# Patient Record
Sex: FEMALE | Race: WHITE | NOT HISPANIC OR LATINO
[De-identification: names, ages, dates, MRNs, and addresses within clinical notes are randomized per-mention and may not be internally consistent; named-entity substitution may affect disease eponyms.]

---

## 2024-03-18 PROBLEM — Z00.00 ENCOUNTER FOR PREVENTIVE HEALTH EXAMINATION: Status: ACTIVE | Noted: 2024-03-18

## 2024-03-20 ENCOUNTER — NON-APPOINTMENT (OUTPATIENT)
Age: 54
End: 2024-03-20

## 2024-03-21 ENCOUNTER — APPOINTMENT (OUTPATIENT)
Dept: BREAST CENTER | Facility: CLINIC | Age: 54
End: 2024-03-21
Payer: COMMERCIAL

## 2024-03-21 ENCOUNTER — TRANSCRIPTION ENCOUNTER (OUTPATIENT)
Age: 54
End: 2024-03-21

## 2024-03-21 ENCOUNTER — RESULT REVIEW (OUTPATIENT)
Age: 54
End: 2024-03-21

## 2024-03-21 ENCOUNTER — NON-APPOINTMENT (OUTPATIENT)
Age: 54
End: 2024-03-21

## 2024-03-21 DIAGNOSIS — R59.9 ENLARGED LYMPH NODES, UNSPECIFIED: ICD-10-CM

## 2024-03-21 DIAGNOSIS — R03.0 ELEVATED BLOOD-PRESSURE READING, W/OUT DIAGNOSIS OF HYPERTENSION: ICD-10-CM

## 2024-03-21 DIAGNOSIS — M19.049 PRIMARY OSTEOARTHRITIS, UNSPECIFIED HAND: ICD-10-CM

## 2024-03-21 PROCEDURE — 93702 BIS XTRACELL FLUID ANALYSIS: CPT | Mod: NC

## 2024-03-21 PROCEDURE — 99205 OFFICE O/P NEW HI 60 MIN: CPT | Mod: 25

## 2024-03-22 PROBLEM — M19.049 ARTHRITIS OF HAND: Status: RESOLVED | Noted: 2024-03-22 | Resolved: 2024-03-22

## 2024-03-22 PROBLEM — R03.0 BORDERLINE HYPERTENSION: Status: RESOLVED | Noted: 2024-03-22 | Resolved: 2024-03-22

## 2024-03-24 ENCOUNTER — NON-APPOINTMENT (OUTPATIENT)
Age: 54
End: 2024-03-24

## 2024-03-27 ENCOUNTER — NON-APPOINTMENT (OUTPATIENT)
Age: 54
End: 2024-03-27

## 2024-03-28 NOTE — CONSULT LETTER
[( Thank you for referring [unfilled] for consultation for _____ )] : Thank you for referring [unfilled] for consultation for [unfilled] [Dear  ___] : Dear  [unfilled], [Please see my note below.] : Please see my note below. [Consult Closing:] : Thank you very much for allowing me to participate in the care of this patient.  If you have any questions, please do not hesitate to contact me. [Sincerely,] : Sincerely, [DrNery  ___] : Dr. DAVIS [FreeTextEntry3] : Crystal [place] [address]

## 2024-03-28 NOTE — ASSESSMENT
[FreeTextEntry1] : The pathology and imaging results were reviewed and discussed. She is a stage IA left breast cancer (T1cN0) HER2 negative, ER+/HI+ (AJCC 8thed).  Breast MRI is recommended to rule out multicentric or multifocal disease.  The use of multimodality therapy for the treatment of breast cancer was discussed.   Surgical options were reviewed including a lumpectomy to negative margins, with whole breast radiation therapy versus a mastectomy with or without reconstruction. Included in this discussion with the results of the NSABP-04 and 06 trials.   Mastectomy techniques were discussed in detail including skin sparing and nipple sparing techniques. Recurrence was reviewed and that mastectomy does not confer a 100% risk reduction nor guarantee against breast cancer in the future.  Reconstructive options were briefly reviewed, including implant based versus tissue flap based reconstruction options.  Referral  to a plastic surgeon was offered.  The patient was informed that breast reconstruction is covered by insurance per state and federal laws.     Axillary staging was discussed. We reviewed the sentinel lymph node procedure, and how if the sentinel lymph node is found to have metastatic disease either on the intraoperative evaluation or on the final pathology, that an axillary dissection of the remaining lymph nodes may be recommended. It was explained that an axillary dissection takes "level one and level two" lymph nodes, and also "level three" if they feel clinically suspicious. It was explained that if the sentinel lymph node was not able to be found, that an axillary dissection may be necessary.  I reviewed the risks of lymphedema for SLND (6%) versus ALND (20%). I reviewed our lymphedema monitoring program. Baseline SOZO was completed.  The Z-0011 study was touched upon, outlining that there is evidence that it may not be necessary to complete an axillary dissection in select patients even if one or two sentinel nodes are positive for cancer, as long as the cancer in the nodes is confined to within the nodes, the nodes aren't  matted down, and if the cancer meets certain criteria including being less than 5 cm, treated by lumpectomy and conventional whole breast radiation, and the patient is planning to take recommended adjuvant therapy, and didn't require preoperative chemotherapy.     The general indications for the use of adjuvant hormonal and chemotherapy and targeted therapies were discussed. It was explained that medical treatments are dependent on pathology results including receptor studies.  It was explained that some patients have further genetic testing of their tumors before a decision about chemotherapy can be made.  The indications for radiation therapy and side effects were also discussed including the use varying lengths of whole breast radiation.  It was explained that radiation is generally reserved for lumpectomy patients, and patients with larger tumors or positive lymph nodes. Common side effects were reviewed.  The genetics of breast cancer were discussed with the implications for risk of contralateral cancer and other associated cancers, implication for ovarian risk, options for management, and implications for family members. She had genetic testing done today.  She was given a binder. She is motivated towards breast conserving surgery with symmetrizing lift. She will see plastic surgery after MRI. I will get 2nd opinion pathology at [facility] and also call her with genetic results asap. I believe I was able to answer all of her questions to her satisfaction.

## 2024-03-28 NOTE — PHYSICAL EXAM
[Normocephalic] : normocephalic [Atraumatic] : atraumatic [Supple] : supple [No Supraclavicular Adenopathy] : no supraclavicular adenopathy [Examined in the supine and seated position] : examined in the supine and seated position [Grade 2] : Ptosis Grade 2 [No dominant masses] : no dominant masses in right breast  [No Nipple Retraction] : no left nipple retraction [No Nipple Discharge] : no left nipple discharge [No Axillary Lymphadenopathy] : no left axillary lymphadenopathy [No Rashes] : no rashes [No Edema] : no edema [No Ulceration] : no ulceration [de-identified] : L>R, there is bruising in the UOQ and palpable 1cm mass c/w known carcinoma

## 2024-03-28 NOTE — HISTORY OF PRESENT ILLNESS
[FreeTextEntry1] : Natalie is a 54-year-old female referred for newly diagnosed invasive ductal carcinoma of the left breast.   Her screening mammogram (08/29/2023, [facility]) showed scattered fibroglandular densities and a small irregular 0.7 x 0.5 cm mass in the left superolateral breast.  Diagnostic mammogram and left targeted ultrasound were recommended and done on 03/05/2024 ([facility]). Mammo findings confirmed a persistent superolateral breast asymmetry with correlating ultrasound findings of a 0.8 x 1 0.5 cm ill-defined mass in the left breast 2:00 N6. Ultrasound biopsy was advised and done on 3/58/2024 ([facility]). Pathology of left 2:00 N6 (heart-shaped clip) showed moderately differentiated IDC, grade 6/9, ER strongly to moderately positive in >90%, SC strongly positive in >90%, Her2 negative and Ki67 9%.  She does SBE. She has not noticed a change in her breast or a breast lump. She has not noticed a change in her nipple or nipple area. She has not noticed a change in the skin of the breast. She is not experiencing nipple discharge. She is not experiencing breast pain. She has not noticed a lump or lymph node under the armpit.   BREAST CANCER RISK FACTORS Menarche:14 Date of LMP:3/12/2024 Menopause:NA Grav:   5   Para: 3 Age at first live birth: 33 Nursed: Y Hysterectomy: N Oophorectomy: N OCP: Y for 5 yeras HRT: N Last pap/pelvic exam: 8/2023 Related family history:mgm bca @90s; Maunt bca @ 60's; M aunt colon ca @70s Ashkenazi: N Mastery risk assessment: NA Genetic testing: sent today Bra size:34 C   Last mammogram:  03/05/2024 Left Diag (bilat 08/29/2023)    Location: [facility] Report reviewed.                                 Images reviewed. Results: Birads 4 Scattered fibroglandular densities. Left superolateral persistent superolateral breast asymmetry   Last ultrasound:   03/05/2024 Left targeted.            Location: [facility] Report reviewed.                                 Images reviewed. Results: Birads 4 Left breast 2:00 N60.8 x 1 0.5 cm ill-defined mass. Rec: Left ultrasound biopsy    Last MRI:  never                                           Location: Report reviewed.

## 2024-03-29 DIAGNOSIS — R92.8 OTHER ABNORMAL AND INCONCLUSIVE FINDINGS ON DIAGNOSTIC IMAGING OF BREAST: ICD-10-CM

## 2024-04-11 ENCOUNTER — RESULT REVIEW (OUTPATIENT)
Age: 54
End: 2024-04-11

## 2024-04-11 ENCOUNTER — APPOINTMENT (OUTPATIENT)
Dept: BREAST CENTER | Facility: HOSPITAL | Age: 54
End: 2024-04-11

## 2024-04-11 ENCOUNTER — TRANSCRIPTION ENCOUNTER (OUTPATIENT)
Age: 54
End: 2024-04-11

## 2024-04-16 DIAGNOSIS — N63.0 UNSPECIFIED LUMP IN UNSPECIFIED BREAST: ICD-10-CM

## 2024-04-17 ENCOUNTER — NON-APPOINTMENT (OUTPATIENT)
Age: 54
End: 2024-04-17

## 2024-04-17 ENCOUNTER — APPOINTMENT (OUTPATIENT)
Dept: BREAST CENTER | Facility: CLINIC | Age: 54
End: 2024-04-17
Payer: COMMERCIAL

## 2024-04-17 DIAGNOSIS — F51.02 ADJUSTMENT INSOMNIA: ICD-10-CM

## 2024-04-17 PROCEDURE — 99024 POSTOP FOLLOW-UP VISIT: CPT

## 2024-04-17 RX ORDER — DIAZEPAM 5 MG/1
5 TABLET ORAL
Qty: 10 | Refills: 0 | Status: ACTIVE | COMMUNITY
Start: 2024-04-17 | End: 1900-01-01

## 2024-04-17 NOTE — CONSULT LETTER
[Dear  ___] : Dear  [unfilled], [( Thank you for referring [unfilled] for consultation for _____ )] : Thank you for referring [unfilled] for consultation for [unfilled] [Please see my note below.] : Please see my note below. [Consult Closing:] : Thank you very much for allowing me to participate in the care of this patient.  If you have any questions, please do not hesitate to contact me. [Sincerely,] : Sincerely, [DrNery  ___] : Dr. DAVIS [Courtesy Letter:] : I had the pleasure of seeing your patient, [unfilled], in my office today. [FreeTextEntry1] : She has requested to see medical oncology at [facility] and radiation oncology at . [FreeTextEntry3] : Crystal [place] [address]

## 2024-04-17 NOTE — PHYSICAL EXAM
[Normocephalic] : normocephalic [Atraumatic] : atraumatic [Supple] : supple [No Supraclavicular Adenopathy] : no supraclavicular adenopathy [Examined in the supine and seated position] : examined in the supine and seated position [Grade 2] : Ptosis Grade 2 [No dominant masses] : no dominant masses in right breast  [No Nipple Retraction] : no left nipple retraction [No Nipple Discharge] : no left nipple discharge [No Axillary Lymphadenopathy] : no left axillary lymphadenopathy [No Edema] : no edema [No Rashes] : no rashes [No Ulceration] : no ulceration [de-identified] : L>R, there is bruising in the UOQ and palpable 1cm mass c/w known carcinoma [Symmetrical] : symmetrical [No dominant masses] : no dominant masses left breast [de-identified] : healing well, bruising as expected, no collections

## 2024-04-17 NOTE — ASSESSMENT
[FreeTextEntry1] : The pathology and imaging results were reviewed and discussed. She is a stage IA left breast cancer (T1cN0) HER2 negative, ER+/DE+ (AJCC 8thed). doing well path reviewed copy given rec med/onc cs and rad/onc cs--requesting RTx at [facility]  will send oncotype  f/u 1 month She knows to call or return sooner should any concerns or questions arise.

## 2024-04-17 NOTE — HISTORY OF PRESENT ILLNESS
[FreeTextEntry1] : Natalie is a 54-year-old female referred for newly diagnosed invasive ductal carcinoma of the left breast.   Her screening mammogram (08/29/2023, [facility]) showed scattered fibroglandular densities and a small irregular 0.7 x 0.5 cm mass in the left superolateral breast.  Diagnostic mammogram and left targeted ultrasound were recommended and done on 03/05/2024 ([facility]). Mammo findings confirmed a persistent superolateral breast asymmetry with correlating ultrasound findings of a 0.8 x 1 0.5 cm ill-defined mass in the left breast 2:00 N6. Ultrasound biopsy was advised and done on 3/58/2024 ([facility]). Pathology of left 2:00 N6 (heart-shaped clip) showed moderately differentiated IDC, grade 6/9, ER strongly to moderately positive in >90%, NJ strongly positive in >90%, Her2 negative and Ki67 9%.  4/11/2024 patient underwent a left partial mastectomy/slnb- final pathology showed:  IDC, 10mm, grade 4/9, ER strongly positive in %, Pr strongly positive in %, Her 2 negative and Ki67 5%.  All margins negative for carcinoma. SLNE showed 0/9 nodes negative for malignancy. Closest margin 8 mm anterior. All others > 10mm. She is doing well post op. She is c/o insomnia.  She does SBE. She has not noticed a change in her breast or a breast lump. She has not noticed a change in her nipple or nipple area. She has not noticed a change in the skin of the breast. She is not experiencing nipple discharge. She is not experiencing breast pain. She has not noticed a lump or lymph node under the armpit.   BREAST CANCER RISK FACTORS Menarche:14 Date of LMP:3/12/2024 Menopause:NA Grav:   5   Para: 3 Age at first live birth: 33 Nursed: Y Hysterectomy: N Oophorectomy: N OCP: Y for 5 yeras HRT: N Last pap/pelvic exam: 8/2023 Related family history:mgm bca @90s; Maunt bca @ 60's; M aunt colon ca @70s Ashkenazi: N Mastery risk assessment: NA Genetic testing: negative myriad 3/2024 Bra size:34 C   Last mammogram:  03/05/2024 Left Diag (bilat 08/29/2023)    Location: [facility] Report reviewed.                                 Images reviewed. Results: Birads 4 Scattered fibroglandular densities. Left superolateral persistent superolateral breast asymmetry   Last ultrasound:   03/05/2024 Left targeted.            Location: [facility] Report reviewed.                                 Images reviewed. Results: Birads 4 Left breast 2:00 N60.8 x 1 0.5 cm ill-defined mass. Rec: Left ultrasound biopsy    Last MRI:  never                                           Location: Report reviewed.

## 2024-04-19 ENCOUNTER — APPOINTMENT (OUTPATIENT)
Dept: BREAST CENTER | Facility: CLINIC | Age: 54
End: 2024-04-19
Payer: COMMERCIAL

## 2024-04-19 VITALS
BODY MASS INDEX: 22.15 KG/M2 | HEIGHT: 63 IN | HEART RATE: 75 BPM | DIASTOLIC BLOOD PRESSURE: 77 MMHG | SYSTOLIC BLOOD PRESSURE: 122 MMHG | WEIGHT: 125 LBS | OXYGEN SATURATION: 99 %

## 2024-04-19 PROCEDURE — 10021 FNA BX W/O IMG GDN 1ST LES: CPT

## 2024-04-19 PROCEDURE — 99024 POSTOP FOLLOW-UP VISIT: CPT

## 2024-04-19 RX ORDER — ESCITALOPRAM OXALATE 10 MG/1
10 TABLET, FILM COATED ORAL
Refills: 0 | Status: ACTIVE | COMMUNITY

## 2024-04-19 NOTE — ASSESSMENT
[FreeTextEntry1] : 55 yo female with left axillary hematoma aspiration completed without incident plan us follow up next week She knows to call or return sooner should any concerns or questions arise.

## 2024-04-19 NOTE — HISTORY OF PRESENT ILLNESS
[FreeTextEntry1] : Natalie is a 54-year-old female referred for newly diagnosed invasive ductal carcinoma of the left breast.   Her screening mammogram (08/29/2023, [facility]) showed scattered fibroglandular densities and a small irregular 0.7 x 0.5 cm mass in the left superolateral breast.  Diagnostic mammogram and left targeted ultrasound were recommended and done on 03/05/2024 ([facility]). Mammo findings confirmed a persistent superolateral breast asymmetry with correlating ultrasound findings of a 0.8 x 1 0.5 cm ill-defined mass in the left breast 2:00 N6. Ultrasound biopsy was advised and done on 3/58/2024 ([facility]). Pathology of left 2:00 N6 (heart-shaped clip) showed moderately differentiated IDC, grade 6/9, ER strongly to moderately positive in >90%, AZ strongly positive in >90%, Her2 negative and Ki67 9%.  4/11/2024 patient underwent a left partial mastectomy/slnb- final pathology showed:  IDC, 10mm, grade 4/9, ER strongly positive in %, Pr strongly positive in %, Her 2 negative and Ki67 5%.  All margins negative for carcinoma. SLNE showed 0/9 nodes negative for malignancy. Closest margin 8 mm anterior. All others > 10mm. She is c/o today of swelling under the arm for past 48hours. She does SBE. She has not noticed a change in her breast or a breast lump. She has not noticed a change in her nipple or nipple area. She has not noticed a change in the skin of the breast. She is not experiencing nipple discharge. She is experiencing breast pain left axilla She has noticed a lump or lymph node under the left armpit   BREAST CANCER RISK FACTORS Menarche:14 Date of LMP:3/12/2024 Menopause:NA Grav:   5   Para: 3 Age at first live birth: 33 Nursed: Y Hysterectomy: N Oophorectomy: N OCP: Y for 5 yeras HRT: N Last pap/pelvic exam: 8/2023 Related family history:mgm bca @90s; Maunt bca @ 60's; M aunt colon ca @70s Ashkenazi: N Mastery risk assessment: NA Genetic testing: negative myriad 3/2024 Bra size:34 C   Last mammogram:  03/05/2024 Left Diag (bilat 08/29/2023)    Location: [facility] Report reviewed.                                 Images reviewed. Results: Birads 4 Scattered fibroglandular densities. Left superolateral persistent superolateral breast asymmetry   Last ultrasound:   03/05/2024 Left targeted.            Location: [facility] Report reviewed.                                 Images reviewed. Results: Birads 4 Left breast 2:00 N60.8 x 1 0.5 cm ill-defined mass. Rec: Left ultrasound biopsy    Last MRI:  never                                           Location: Report reviewed.

## 2024-04-26 ENCOUNTER — NON-APPOINTMENT (OUTPATIENT)
Age: 54
End: 2024-04-26

## 2024-05-01 ENCOUNTER — APPOINTMENT (OUTPATIENT)
Dept: BREAST CENTER | Facility: CLINIC | Age: 54
End: 2024-05-01
Payer: COMMERCIAL

## 2024-05-01 VITALS
OXYGEN SATURATION: 100 % | WEIGHT: 125 LBS | SYSTOLIC BLOOD PRESSURE: 122 MMHG | HEIGHT: 63 IN | BODY MASS INDEX: 22.15 KG/M2 | DIASTOLIC BLOOD PRESSURE: 80 MMHG | HEART RATE: 65 BPM

## 2024-05-01 DIAGNOSIS — L76.34 POSTPROCEDURAL SEROMA OF SKIN AND SUBCUTANEOUS TISSUE FOLLOWING OTHER PROCEDURE: ICD-10-CM

## 2024-05-01 PROCEDURE — 99024 POSTOP FOLLOW-UP VISIT: CPT

## 2024-05-07 ENCOUNTER — RESULT REVIEW (OUTPATIENT)
Age: 54
End: 2024-05-07

## 2024-05-07 ENCOUNTER — NON-APPOINTMENT (OUTPATIENT)
Age: 54
End: 2024-05-07

## 2024-05-07 ENCOUNTER — APPOINTMENT (OUTPATIENT)
Dept: HEMATOLOGY ONCOLOGY | Facility: CLINIC | Age: 54
End: 2024-05-07
Payer: COMMERCIAL

## 2024-05-07 VITALS
SYSTOLIC BLOOD PRESSURE: 138 MMHG | DIASTOLIC BLOOD PRESSURE: 82 MMHG | TEMPERATURE: 98.3 F | RESPIRATION RATE: 18 BRPM | HEART RATE: 70 BPM | WEIGHT: 126 LBS | HEIGHT: 63 IN | OXYGEN SATURATION: 99 % | BODY MASS INDEX: 22.32 KG/M2

## 2024-05-07 DIAGNOSIS — Z78.9 OTHER SPECIFIED HEALTH STATUS: ICD-10-CM

## 2024-05-07 DIAGNOSIS — Z80.3 FAMILY HISTORY OF MALIGNANT NEOPLASM OF BREAST: ICD-10-CM

## 2024-05-07 PROCEDURE — 99205 OFFICE O/P NEW HI 60 MIN: CPT

## 2024-05-07 PROCEDURE — 36415 COLL VENOUS BLD VENIPUNCTURE: CPT

## 2024-05-07 RX ORDER — TAMOXIFEN CITRATE 20 MG/1
20 TABLET, FILM COATED ORAL DAILY
Qty: 90 | Refills: 3 | Status: ACTIVE | COMMUNITY
Start: 2024-05-07 | End: 1900-01-01

## 2024-05-07 NOTE — PHYSICAL EXAM
[Fully active, able to carry on all pre-disease performance without restriction] : Status 0 - Fully active, able to carry on all pre-disease performance without restriction [Normal] : affect appropriate [de-identified] : s/p bilateral lumpectomy. healing well, no signs of infection,. no palpable abnormalities or lymphadenopathy bilaterally

## 2024-05-07 NOTE — CONSULT LETTER
[Dear  ___] : Dear  [unfilled], [Consult Letter:] : I had the pleasure of evaluating your patient, [unfilled]. [Please see my note below.] : Please see my note below. [Consult Closing:] : Thank you very much for allowing me to participate in the care of this patient.  If you have any questions, please do not hesitate to contact me. [Sincerely,] : Sincerely, [FreeTextEntry3] : Regla Faustin DO, FACABEBA, FACP Medical Oncology and Hematology [facility]

## 2024-05-07 NOTE — ASSESSMENT
[FreeTextEntry1] : #L sided IDC, 10mm, grade 4/9, ER strongly positive in %, Pr strongly positive in %, Her 2 negative and Ki67 5%. All margins negative for carcinoma. SLNE showed 0/9 nodes negative for malignancy. Reviewed the diagnosis, prognosis and natural history of IDC, ER/MD+ HER2- Reviewed the imaging and path Reviewed the NCCN guidelines and patient expresses understanding Oncotype 7 - no benefit for chemo She is premenopausal thus would recommend Tamoxifen. We reviewed the side effects of tamoxifen to include but are not limited to increased risk of uterine cancer, increased risk of VTE, hot flashes, decreased labido, vaginal dryness, mood swings prior to starting tamoxifen she will be evaluated by Radiation oncology to discuss the role of RT.  She lives in [place] thus would like to do RT in [place]. She was given the name of an Radonc by Dr. Tena We did discuss the  Trial here but she is not interested She will continue to follow with Dr. Tena  RTC in 3 months with cbc with diff, cmp, vit D and to see how she is tolerating tamoxifen

## 2024-05-07 NOTE — HISTORY OF PRESENT ILLNESS
[de-identified] : Ms. Xiong is a 54-year-old female referred for newly diagnosed invasive ductal carcinoma of the left breast.   Oncological History:   s/p screening josé 8/29/2023 showed scattered fibroglandular densities and a small irregular 0.7 x 0.5 cm mass in the left superolateral breast.   s/p diagnostic mammogram and left targeted ultrasound were recommended and done on 03/05/2024. Mammo findings confirmed a persistent superolateral breast asymmetry with correlating ultrasound findings of a 0.8 x 1 0.5 cm ill-defined mass in the left breast 2:00 N6.   s/p US guided bx 3/58/2024 pathology of left 2:00 N6 (heart-shaped clip) showed moderately differentiated IDC, grade 6/9, ER strongly to moderately positive in >90%, NM strongly positive in >90%, Her2 negative and Ki67 9%.  4/11/2024 patient underwent a left partial mastectomy/slnb- final pathology showed: IDC, 10mm, grade 4/9, ER strongly positive in %, Pr strongly positive in %, Her 2 negative and Ki67 5%. All margins negative for carcinoma. SLNE showed 0/9 nodes negative for malignancy. Closest margin 8 mm anterior. All others > 10mm. S  Oncotype 7  BREAST CANCER RISK FACTORS Menarche:14 Date of LMP:4/2024  Menopause: NA Grav: 5 Para: 3 Age at first live birth: 33 Nursed: Y Hysterectomy: N Oophorectomy: N OCP: Y for 5 yeras HRT: N Last pap/pelvic exam: 8/2023 Related family history:mgm bca @90s; Maunt bca @ 60's; M aunt colon ca @70s Ashkenazi: N Mastery risk assessment: NA Genetic testing: negative myriad 3/2024

## 2024-05-10 NOTE — ASSESSMENT
[FreeTextEntry1] : 53 yo female with left axillary hematoma doing well plan observation plan f/u on 5/17 She knows to call or return sooner should any concerns or questions arise.

## 2024-05-10 NOTE — HISTORY OF PRESENT ILLNESS
[FreeTextEntry1] : Natalie is a 54-year-old female referred for newly diagnosed invasive ductal carcinoma of the left breast.   Her screening mammogram (08/29/2023, [facility]) showed scattered fibroglandular densities and a small irregular 0.7 x 0.5 cm mass in the left superolateral breast.  Diagnostic mammogram and left targeted ultrasound were recommended and done on 03/05/2024 ([facility]). Mammo findings confirmed a persistent superolateral breast asymmetry with correlating ultrasound findings of a 0.8 x 1 0.5 cm ill-defined mass in the left breast 2:00 N6. Ultrasound biopsy was advised and done on 3/58/2024 ([facility]). Pathology of left 2:00 N6 (heart-shaped clip) showed moderately differentiated IDC, grade 6/9, ER strongly to moderately positive in >90%, NH strongly positive in >90%, Her2 negative and Ki67 9%.  4/3/24 patient underwent bilateral MRI biopsies x4. Final pathology (1) Right 10:00 n3, anterior: portions sclerosing intraductal papilloma with calcs and associated duct ectasia, (2) Right 3:00 n3, posterior: radial sclerosing lesion, (3) Left 2:00 n6, outer: fibroadenomatous change, columnar cell change with microcyst formation and PASH, and (4) Left 4:00-6:00, inner: mild duct ectasia, adenosis, and stromal fibrosis.  4/11/2024 patient underwent a left partial mastectomy/slnb- final pathology showed: IDC, 10mm, grade 4/9, ER strongly positive in %, Pr strongly positive in %, Her 2 negative and Ki67 5%. All margins negative for carcinoma. SLNE showed 0/9 nodes negative for malignancy. Closest margin 8 mm anterior. All others > 10mm. Oncotype score 7. Right breast partial mastectomy final pathology showed benign radial sclerosing lesion with cystic metaplasia.  She does SBE. She has not noticed a change in her breast or a breast lump. She has not noticed a change in her nipple or nipple area. She has not noticed a change in the skin of the breast. She is not experiencing nipple discharge. She is experiencing breast pain left axilla She has noticed a lump or lymph node under the left armpit   BREAST CANCER RISK FACTORS Menarche:14 Date of LMP:3/12/2024 Menopause:NA Grav:   5   Para: 3 Age at first live birth: 33 Nursed: Y Hysterectomy: N Oophorectomy: N OCP: Y for 5 yeras HRT: N Last pap/pelvic exam: 8/2023 Related family history:mgm bca @90s; Maglenys bca @ 60's; M aunt colon ca @70s Ashkenazi: N Mastery risk assessment: NA Genetic testing: negative myriad 3/2024 Bra size:34 C   Last mammogram:  03/05/2024 Left Diag (bilat 08/29/2023)    Location: [facility] Report reviewed.                                 Images reviewed. Results: Birads 4 Scattered fibroglandular densities. Left superolateral persistent superolateral breast asymmetry   Last ultrasound:   03/05/2024 Left targeted.            Location: [facility] Report reviewed.                                 Images reviewed. Results: Birads 4 Left breast 2:00 N60.8 x 1 0.5 cm ill-defined mass. Rec: Left ultrasound biopsy    Last MRI:  never                                           Location: Report reviewed.

## 2024-05-10 NOTE — PHYSICAL EXAM
[de-identified] : healing PMX, bruising as expected, improving overall [de-identified] : there is now bruising and swelling along left axilla

## 2024-05-17 ENCOUNTER — APPOINTMENT (OUTPATIENT)
Dept: BREAST CENTER | Facility: CLINIC | Age: 54
End: 2024-05-17
Payer: COMMERCIAL

## 2024-05-17 DIAGNOSIS — N64.89 OTHER SPECIFIED DISORDERS OF BREAST: ICD-10-CM

## 2024-05-17 DIAGNOSIS — C50.412 MALIGNANT NEOPLASM OF UPPER-OUTER QUADRANT OF LEFT FEMALE BREAST: ICD-10-CM

## 2024-05-17 DIAGNOSIS — Z17.0 MALIGNANT NEOPLASM OF UPPER-OUTER QUADRANT OF LEFT FEMALE BREAST: ICD-10-CM

## 2024-05-17 PROCEDURE — 99024 POSTOP FOLLOW-UP VISIT: CPT

## 2024-05-17 PROCEDURE — 93702 BIS XTRACELL FLUID ANALYSIS: CPT | Mod: NC

## 2024-05-17 NOTE — PHYSICAL EXAM
[de-identified] : healing PMX, bruising as expected, improving overall [de-identified] : there is now bruising and swelling along left axilla

## 2024-05-17 NOTE — ASSESSMENT
[FreeTextEntry1] : 55 yo female with left axillary hematoma doing well rtx as scheduled 20 tx plan mg/sono 12/2024 SOZO today WNL next in 3 months plan AI and surveillance per Dr. Faustin after RTx She knows to call or return sooner should any concerns or questions arise.

## 2024-07-30 ENCOUNTER — NON-APPOINTMENT (OUTPATIENT)
Age: 54
End: 2024-07-30

## 2024-07-30 NOTE — PHYSICAL EXAM
[de-identified] : healing PMX, bruising as expected, improving overall [de-identified] : there is now bruising and swelling along left axilla

## 2024-07-30 NOTE — ASSESSMENT
[FreeTextEntry1] : 53 yo female with left axillary hematoma doing well rtx as scheduled 20 tx plan mg/sono 12/2024 SOZO today WNL next in 3 months plan AI and surveillance per Dr. Faustin after RTx She knows to call or return sooner should any concerns or questions arise.

## 2024-07-30 NOTE — HISTORY OF PRESENT ILLNESS
[FreeTextEntry1] : Natalie is a 54-year-old female referred for newly diagnosed invasive ductal carcinoma of the left breast.   Her screening mammogram (08/29/2023, [facility]) showed scattered fibroglandular densities and a small irregular 0.7 x 0.5 cm mass in the left superolateral breast.  Diagnostic mammogram and left targeted ultrasound were recommended and done on 03/05/2024 ([facility]). Mammo findings confirmed a persistent superolateral breast asymmetry with correlating ultrasound findings of a 0.8 x 1 0.5 cm ill-defined mass in the left breast 2:00 N6. Ultrasound biopsy was advised and done on 3/2024 ([facility]). Pathology of left 2:00 N6 (heart-shaped clip) showed moderately differentiated IDC, grade 6/9, ER strongly to moderately positive in >90%, [place] strongly positive in >90%, Her2 negative and Ki67 9%.  4/3/24 patient underwent bilateral MRI biopsies x4. Final pathology (1) Right 10:00 n3, anterior: portions sclerosing intraductal papilloma with calcs and associated duct ectasia, (2) Right 3:00 n3, posterior: radial sclerosing lesion, (3) Left 2:00 n6, outer: fibroadenomatous change, columnar cell change with microcyst formation and PASH, and (4) Left 4:00-6:00, inner: mild duct ectasia, adenosis, and stromal fibrosis.   4/11/2024 patient underwent a left partial mastectomy/slnb- final pathology showed:  IDC, 10mm, grade 4/9, ER strongly positive in %, Pr strongly positive in %, Her 2 negative and Ki67 5%.  All margins negative for carcinoma. SLNE showed 0/9 nodes negative for malignancy. Closest margin 8 mm anterior. All others > 10mm.  Oncotype score 7. Right breast partial mastectomy final pathology showed benign radial sclerosing lesion with cystic metaplasia.  She completed RTx on **********   She does SBE. She has not noticed a change in her breast or a breast lump. She has not noticed a change in her nipple or nipple area. She has not noticed a change in the skin of the breast. She is not experiencing nipple discharge. She is experiencing breast pain left axilla She has noticed a lump or lymph node under the left armpit   BREAST CANCER RISK FACTORS Menarche:14 Date of LMP:3/12/2024 Menopause:NA Grav:   5   Para: 3 Age at first live birth: 33 Nursed: Y Hysterectomy: N Oophorectomy: N OCP: Y for 5 yeras HRT: N Last pap/pelvic exam: 8/2023 Related family history:mgm bca @90s; Maunt bca @ 60's; M aunt colon ca @70s Ashkenazi: N Mastery risk assessment: NA Genetic testing: negative myriad 3/2024 Bra size:34 C   Last mammogram:  03/05/2024 Left Diag (bilat 08/29/2023)    Location: [facility] Report reviewed.                                 Images reviewed. Results: Birads 4 Scattered fibroglandular densities. Left superolateral persistent superolateral breast asymmetry   Last ultrasound:   03/05/2024 Left targeted.            Location: [facility] Report reviewed.                                 Images reviewed. Results: Birads 4 Left breast 2:00 N60.8 x 1 0.5 cm ill-defined mass. Rec: Left ultrasound biopsy    Last MRI:  never                                           Location: Report reviewed.

## 2024-08-06 ENCOUNTER — RESULT REVIEW (OUTPATIENT)
Age: 54
End: 2024-08-06

## 2024-08-06 ENCOUNTER — APPOINTMENT (OUTPATIENT)
Dept: HEMATOLOGY ONCOLOGY | Facility: CLINIC | Age: 54
End: 2024-08-06

## 2024-08-06 ENCOUNTER — LABORATORY RESULT (OUTPATIENT)
Age: 54
End: 2024-08-06

## 2024-08-06 PROBLEM — R53.83 FATIGUE, UNSPECIFIED TYPE: Status: ACTIVE | Noted: 2024-08-06

## 2024-08-06 PROBLEM — G25.81 RESTLESS LEGS: Status: ACTIVE | Noted: 2024-08-06

## 2024-08-06 PROCEDURE — 99215 OFFICE O/P EST HI 40 MIN: CPT

## 2024-08-06 NOTE — ASSESSMENT
[FreeTextEntry1] : #L sided IDC, 10mm, grade 4/9, ER strongly positive in %, Pr strongly positive in %, Her 2 negative and Ki67 5%. All margins negative for carcinoma. SLNE showed 0/9 nodes negative for malignancy. Reviewed the diagnosis, prognosis and natural history of IDC, ER/[place]+ HER2- Reviewed the imaging and path Reviewed the NCCN guidelines and patient expresses understanding Oncotype 7 - no benefit for chemo s/p RT currently on Tamoxifen - overall tolerating well. She will see Dr. Tena tomorrow plan for Mammo/sono 10/24 - ordered by Dr. Tena  #RLS/fatigue check iron, ferritin, b12, TSH,   RTC in 3 months with cbc with diff, cmp, vit D

## 2024-08-06 NOTE — CONSULT LETTER
[Dear  ___] : Dear  [unfilled], [Consult Letter:] : I had the pleasure of evaluating your patient, [unfilled]. [Please see my note below.] : Please see my note below. [Consult Closing:] : Thank you very much for allowing me to participate in the care of this patient.  If you have any questions, please do not hesitate to contact me. [Sincerely,] : Sincerely, [FreeTextEntry3] : Regla Faustin DO, FACABEBA, FACP Medical Oncology and Hematology [facility]

## 2024-08-06 NOTE — PHYSICAL EXAM
[Fully active, able to carry on all pre-disease performance without restriction] : Status 0 - Fully active, able to carry on all pre-disease performance without restriction [Normal] : affect appropriate [de-identified] : s/p bilateral lumpectomy. healing well, no signs of infection,. no palpable abnormalities or lymphadenopathy bilaterally

## 2024-08-06 NOTE — HISTORY OF PRESENT ILLNESS
[de-identified] : Ms. Xiong is a 54-year-old female referred for newly diagnosed invasive ductal carcinoma of the left breast.   Oncological History:   s/p screening josé 8/29/2023 showed scattered fibroglandular densities and a small irregular 0.7 x 0.5 cm mass in the left superolateral breast.   s/p diagnostic mammogram and left targeted ultrasound were recommended and done on 03/05/2024. Mammo findings confirmed a persistent superolateral breast asymmetry with correlating ultrasound findings of a 0.8 x 1 0.5 cm ill-defined mass in the left breast 2:00 N6.   s/p US guided bx 3/58/2024 pathology of left 2:00 N6 (heart-shaped clip) showed moderately differentiated IDC, grade 6/9, ER strongly to moderately positive in >90%, [place] strongly positive in >90%, Her2 negative and Ki67 9%.  4/11/2024 patient underwent a left partial mastectomy/slnb- final pathology showed: IDC, 10mm, grade 4/9, ER strongly positive in %, Pr strongly positive in %, Her 2 negative and Ki67 5%. All margins negative for carcinoma. SLNE showed 0/9 nodes negative for malignancy. Closest margin 8 mm anterior. All others > 10mm. S  Oncotype 7  BREAST CANCER RISK FACTORS Menarche:14 Date of LMP:4/2024  Menopause: NA Grav: 5 Para: 3 Age at first live birth: 33 Nursed: Y Hysterectomy: N Oophorectomy: N OCP: Y for 5 yeras HRT: N Last pap/pelvic exam: 8/2023 Related family history:mgm bca @90s; Maunt bca @ 60's; M aunt colon ca @70s Ashkenazi: N Mastery risk assessment: NA Genetic testing: negative myriad 3/2024  [de-identified] : Patient presents today for follow up.  Started tamoxifen 7/2/24 after finishing radiation.  Feels well over all besides fatigue and occasional hot flashes.  Following with  Tomorrow

## 2024-08-06 NOTE — CONSULT LETTER
[Dear  ___] : Dear  [unfilled], [Consult Letter:] : I had the pleasure of evaluating your patient, [unfilled]. [Please see my note below.] : Please see my note below. [Consult Closing:] : Thank you very much for allowing me to participate in the care of this patient.  If you have any questions, please do not hesitate to contact me. [Sincerely,] : Sincerely, [FreeTextEntry3] : Regla Faustin DO, FACABEBA, FACP Medical Oncology and Hematology [facility]

## 2024-08-06 NOTE — HISTORY OF PRESENT ILLNESS
[de-identified] : Ms. Xiong is a 54-year-old female referred for newly diagnosed invasive ductal carcinoma of the left breast.   Oncological History:   s/p screening josé 8/29/2023 showed scattered fibroglandular densities and a small irregular 0.7 x 0.5 cm mass in the left superolateral breast.   s/p diagnostic mammogram and left targeted ultrasound were recommended and done on 03/05/2024. Mammo findings confirmed a persistent superolateral breast asymmetry with correlating ultrasound findings of a 0.8 x 1 0.5 cm ill-defined mass in the left breast 2:00 N6.   s/p US guided bx 3/58/2024 pathology of left 2:00 N6 (heart-shaped clip) showed moderately differentiated IDC, grade 6/9, ER strongly to moderately positive in >90%, ID strongly positive in >90%, Her2 negative and Ki67 9%.  4/11/2024 patient underwent a left partial mastectomy/slnb- final pathology showed: IDC, 10mm, grade 4/9, ER strongly positive in %, Pr strongly positive in %, Her 2 negative and Ki67 5%. All margins negative for carcinoma. SLNE showed 0/9 nodes negative for malignancy. Closest margin 8 mm anterior. All others > 10mm. S  Oncotype 7  BREAST CANCER RISK FACTORS Menarche:14 Date of LMP:4/2024  Menopause: NA Grav: 5 Para: 3 Age at first live birth: 33 Nursed: Y Hysterectomy: N Oophorectomy: N OCP: Y for 5 yeras HRT: N Last pap/pelvic exam: 8/2023 Related family history:mgm bca @90s; Maunt bca @ 60's; M aunt colon ca @70s Ashkenazi: N Mastery risk assessment: NA Genetic testing: negative myriad 3/2024  [de-identified] : Patient presents today for follow up.  Started tamoxifen 7/2/24 after finishing radiation.  Feels well over all besides fatigue and occasional hot flashes.  Following with  Tomorrow

## 2024-08-06 NOTE — HISTORY OF PRESENT ILLNESS
[de-identified] : Ms. Xiong is a 54-year-old female referred for newly diagnosed invasive ductal carcinoma of the left breast.   Oncological History:   s/p screening josé 8/29/2023 showed scattered fibroglandular densities and a small irregular 0.7 x 0.5 cm mass in the left superolateral breast.   s/p diagnostic mammogram and left targeted ultrasound were recommended and done on 03/05/2024. Mammo findings confirmed a persistent superolateral breast asymmetry with correlating ultrasound findings of a 0.8 x 1 0.5 cm ill-defined mass in the left breast 2:00 N6.   s/p US guided bx 3/58/2024 pathology of left 2:00 N6 (heart-shaped clip) showed moderately differentiated IDC, grade 6/9, ER strongly to moderately positive in >90%, [place] strongly positive in >90%, Her2 negative and Ki67 9%.  4/11/2024 patient underwent a left partial mastectomy/slnb- final pathology showed: IDC, 10mm, grade 4/9, ER strongly positive in %, Pr strongly positive in %, Her 2 negative and Ki67 5%. All margins negative for carcinoma. SLNE showed 0/9 nodes negative for malignancy. Closest margin 8 mm anterior. All others > 10mm. S  Oncotype 7  BREAST CANCER RISK FACTORS Menarche:14 Date of LMP:4/2024  Menopause: NA Grav: 5 Para: 3 Age at first live birth: 33 Nursed: Y Hysterectomy: N Oophorectomy: N OCP: Y for 5 yeras HRT: N Last pap/pelvic exam: 8/2023 Related family history:mgm bca @90s; Maunt bca @ 60's; M aunt colon ca @70s Ashkenazi: N Mastery risk assessment: NA Genetic testing: negative myriad 3/2024  [de-identified] : Patient presents today for follow up.  Started tamoxifen 7/2/24 after finishing radiation.  Feels well over all besides fatigue and occasional hot flashes.  Following with  Tomorrow

## 2024-08-06 NOTE — PHYSICAL EXAM
[Fully active, able to carry on all pre-disease performance without restriction] : Status 0 - Fully active, able to carry on all pre-disease performance without restriction [Normal] : affect appropriate [de-identified] : s/p bilateral lumpectomy. healing well, no signs of infection,. no palpable abnormalities or lymphadenopathy bilaterally

## 2024-08-06 NOTE — ASSESSMENT
[FreeTextEntry1] : #L sided IDC, 10mm, grade 4/9, ER strongly positive in %, Pr strongly positive in %, Her 2 negative and Ki67 5%. All margins negative for carcinoma. SLNE showed 0/9 nodes negative for malignancy. Reviewed the diagnosis, prognosis and natural history of IDC, ER/DC+ HER2- Reviewed the imaging and path Reviewed the NCCN guidelines and patient expresses understanding Oncotype 7 - no benefit for chemo s/p RT currently on Tamoxifen - overall tolerating well. She will see Dr. Tena tomorrow plan for Mammo/sono 10/24 - ordered by Dr. Tena  #RLS/fatigue check iron, ferritin, b12, TSH,   RTC in 3 months with cbc with diff, cmp, vit D

## 2024-08-06 NOTE — PHYSICAL EXAM
[Fully active, able to carry on all pre-disease performance without restriction] : Status 0 - Fully active, able to carry on all pre-disease performance without restriction [Normal] : affect appropriate [de-identified] : s/p bilateral lumpectomy. healing well, no signs of infection,. no palpable abnormalities or lymphadenopathy bilaterally

## 2024-08-06 NOTE — ASSESSMENT
[FreeTextEntry1] : #L sided IDC, 10mm, grade 4/9, ER strongly positive in %, Pr strongly positive in %, Her 2 negative and Ki67 5%. All margins negative for carcinoma. SLNE showed 0/9 nodes negative for malignancy. Reviewed the diagnosis, prognosis and natural history of IDC, ER/[place]+ HER2- Reviewed the imaging and path Reviewed the NCCN guidelines and patient expresses understanding Oncotype 7 - no benefit for chemo s/p RT currently on Tamoxifen - overall tolerating well. She will see Dr. Tena tomorrow plan for Mammo/sono 10/24 - ordered by Dr. Tena  #RLS/fatigue check iron, ferritin, b12, TSH,   RTC in 3 months with cbc with diff, cmp, vit D

## 2024-08-07 ENCOUNTER — APPOINTMENT (OUTPATIENT)
Dept: BREAST CENTER | Facility: CLINIC | Age: 54
End: 2024-08-07

## 2024-08-07 PROBLEM — Z92.3 HISTORY OF RADIATION THERAPY: Status: RESOLVED | Noted: 2024-08-07 | Resolved: 2024-08-07

## 2024-08-07 PROCEDURE — 93702 BIS XTRACELL FLUID ANALYSIS: CPT | Mod: NC

## 2024-08-07 PROCEDURE — 99215 OFFICE O/P EST HI 40 MIN: CPT | Mod: 25

## 2024-08-07 NOTE — PHYSICAL EXAM
[Normocephalic] : normocephalic [Atraumatic] : atraumatic [Supple] : supple [No Supraclavicular Adenopathy] : no supraclavicular adenopathy [Examined in the supine and seated position] : examined in the supine and seated position [Symmetrical] : symmetrical [No dominant masses] : no dominant masses in right breast  [No dominant masses] : no dominant masses left breast [No Nipple Retraction] : no left nipple retraction [No Nipple Discharge] : no left nipple discharge [No Axillary Lymphadenopathy] : no left axillary lymphadenopathy [No Edema] : no edema [No Rashes] : no rashes [No Ulceration] : no ulceration [de-identified] : excellent cosmesis, healed incisions [de-identified] : healed PMX, slight radiation changes [de-identified] : there is now bruising and swelling along left axilla

## 2024-08-07 NOTE — PHYSICAL EXAM
[Normocephalic] : normocephalic [Atraumatic] : atraumatic [Supple] : supple [No Supraclavicular Adenopathy] : no supraclavicular adenopathy [Examined in the supine and seated position] : examined in the supine and seated position [Symmetrical] : symmetrical [No dominant masses] : no dominant masses in right breast  [No dominant masses] : no dominant masses left breast [No Nipple Retraction] : no left nipple retraction [No Nipple Discharge] : no left nipple discharge [No Axillary Lymphadenopathy] : no left axillary lymphadenopathy [No Edema] : no edema [No Rashes] : no rashes [No Ulceration] : no ulceration [de-identified] : excellent cosmesis, healed incisions [de-identified] : healed PMX, slight radiation changes [de-identified] : there is now bruising and swelling along left axilla

## 2024-08-07 NOTE — CONSULT LETTER
[Dear  ___] : Dear  [unfilled], [Courtesy Letter:] : I had the pleasure of seeing your patient, [unfilled], in my office today. [Please see my note below.] : Please see my note below. [Consult Closing:] : Thank you very much for allowing me to participate in the care of this patient.  If you have any questions, please do not hesitate to contact me. [Sincerely,] : Sincerely, [FreeTextEntry3] : Crystal Tena [place] [address] [DrNery  ___] : Dr. DAVIS

## 2024-08-07 NOTE — HISTORY OF PRESENT ILLNESS
[FreeTextEntry1] : Natalie is a 54-year-old female referred for newly diagnosed invasive ductal carcinoma of the left breast.   Her screening mammogram (08/29/2023, [facility]) showed scattered fibroglandular densities and a small irregular 0.7 x 0.5 cm mass in the left superolateral breast.  Diagnostic mammogram and left targeted ultrasound were recommended and done on 03/05/2024 ([facility]). Mammo findings confirmed a persistent superolateral breast asymmetry with correlating ultrasound findings of a 0.8 x 1 0.5 cm ill-defined mass in the left breast 2:00 N6. Ultrasound biopsy was advised and done on 3/2024 ([facility]). Pathology of left 2:00 N6 (heart-shaped clip) showed moderately differentiated IDC, grade 6/9, ER strongly to moderately positive in >90%, [place] strongly positive in >90%, Her2 negative and Ki67 9%.  4/3/24 patient underwent bilateral MRI biopsies x4. Final pathology (1) Right 10:00 n3, anterior: portions sclerosing intraductal papilloma with calcs and associated duct ectasia, (2) Right 3:00 n3, posterior: radial sclerosing lesion, (3) Left 2:00 n6, outer: fibroadenomatous change, columnar cell change with microcyst formation and PASH, and (4) Left 4:00-6:00, inner: mild duct ectasia, adenosis, and stromal fibrosis.   4/11/2024 patient underwent a left partial mastectomy/slne- final pathology showed:  IDC, 10mm, grade 4/9, ER strongly positive in %, Pr strongly positive in %, Her 2 negative and Ki67 5%.  All margins negative for carcinoma. SLNE showed 0/9 nodes negative for malignancy. Closest margin 8 mm anterior. All others > 10mm.  Oncotype score 7. Right breast partial mastectomy final pathology showed benign radial sclerosing lesion with cystic metaplasia.  She completed RTx on 7/1/2024. She started tamoxifen 7/2024.    She does SBE. She has not noticed a change in her breast or a breast lump. She has not noticed a change in her nipple or nipple area. She has not noticed a change in the skin of the breast. She is not experiencing nipple discharge. She is experiencing breast pain left axilla She has noticed a lump or lymph node under the left armpit   BREAST CANCER RISK FACTORS Menarche:14 Date of LMP:7/23/2024 Menopause:NA Grav:   5   Para: 3 Age at first live birth: 33 Nursed: Y Hysterectomy: N Oophorectomy: N OCP: Y for 5 yeras HRT: N Last pap/pelvic exam: 8/2023 Related family history:mgm bca @90s; Maunt bca @ 60's; M aunt colon ca @70s Ashkenazi: N Mastery risk assessment: NA Genetic testing: negative myriad 3/2024 Bra size:34 C   Last mammogram:  03/05/2024 Left Diag (bilat 08/29/2023)    Location: [facility] Report reviewed.                                 Images reviewed. Results: Birads 4 Scattered fibroglandular densities. Left superolateral persistent superolateral breast asymmetry   Last ultrasound:   03/05/2024 Left targeted.            Location: [facility] Report reviewed.                                 Images reviewed. Results: Birads 4 Left breast 2:00 N60.8 x 1 0.5 cm ill-defined mass. Rec: Left ultrasound biopsy    Last MRI:  never                                           Location: Report reviewed.

## 2024-08-07 NOTE — CONSULT LETTER
[Dear  ___] : Dear  [unfilled], [Courtesy Letter:] : I had the pleasure of seeing your patient, [unfilled], in my office today. [Please see my note below.] : Please see my note below. [Consult Closing:] : Thank you very much for allowing me to participate in the care of this patient.  If you have any questions, please do not hesitate to contact me. [Sincerely,] : Sincerely, [FreeTextEntry3] : Crystal [place]ena [place] [address] [DrNery  ___] : Dr. DAVIS

## 2024-08-07 NOTE — HISTORY OF PRESENT ILLNESS
[FreeTextEntry1] : Natalie is a 54-year-old female referred for newly diagnosed invasive ductal carcinoma of the left breast.   Her screening mammogram (08/29/2023, [facility]) showed scattered fibroglandular densities and a small irregular 0.7 x 0.5 cm mass in the left superolateral breast.  Diagnostic mammogram and left targeted ultrasound were recommended and done on 03/05/2024 ([facility]). Mammo findings confirmed a persistent superolateral breast asymmetry with correlating ultrasound findings of a 0.8 x 1 0.5 cm ill-defined mass in the left breast 2:00 N6. Ultrasound biopsy was advised and done on 3/2024 ([facility]). Pathology of left 2:00 N6 (heart-shaped clip) showed moderately differentiated IDC, grade 6/9, ER strongly to moderately positive in >90%, [place] strongly positive in >90%, Her2 negative and Ki67 9%.  4/3/24 patient underwent bilateral MRI biopsies x4. Final pathology (1) Right 10:00 n3, anterior: portions sclerosing intraductal papilloma with calcs and associated duct ectasia, (2) Right 3:00 n3, posterior: radial sclerosing lesion, (3) Left 2:00 n6, outer: fibroadenomatous change, columnar cell change with microcyst formation and PASH, and (4) Left 4:00-6:00, inner: mild duct ectasia, adenosis, and stromal fibrosis.   4/11/2024 patient underwent a left partial mastectomy/slne- final pathology showed:  IDC, 10mm, grade 4/9, ER strongly positive in %, Pr strongly positive in %, Her 2 negative and Ki67 5%.  All margins negative for carcinoma. SLNE showed 0/9 nodes negative for malignancy. Closest margin 8 mm anterior. All others > 10mm.  Oncotype score 7. Right breast partial mastectomy final pathology showed benign radial sclerosing lesion with cystic metaplasia.  She completed RTx on 7/1/2024. She started tamoxifen 7/2024.    She does SBE. She has not noticed a change in her breast or a breast lump. She has not noticed a change in her nipple or nipple area. She has not noticed a change in the skin of the breast. She is not experiencing nipple discharge. She is experiencing breast pain left axilla She has noticed a lump or lymph node under the left armpit   BREAST CANCER RISK FACTORS Menarche:14 Date of LMP:7/23/2024 Menopause:NA Grav:   5   Para: 3 Age at first live birth: 33 Nursed: Y Hysterectomy: N Oophorectomy: N OCP: Y for 5 yeras HRT: N Last pap/pelvic exam: 8/2023 Related family history:mgm bca @90s; Maunt bca @ 60's; M aunt colon ca @70s Ashkenazi: N Mastery risk assessment: NA Genetic testing: negative myriad 3/2024 Bra size:34 C   Last mammogram:  03/05/2024 Left Diag (bilat 08/29/2023)    Location: [facility] Report reviewed.                                 Images reviewed. Results: Birads 4 Scattered fibroglandular densities. Left superolateral persistent superolateral breast asymmetry   Last ultrasound:   03/05/2024 Left targeted.            Location: [facility] Report reviewed.                                 Images reviewed. Results: Birads 4 Left breast 2:00 N60.8 x 1 0.5 cm ill-defined mass. Rec: Left ultrasound biopsy    Last MRI:  never                                           Location: Report reviewed.

## 2024-08-07 NOTE — ASSESSMENT
[FreeTextEntry1] : 53 yo female with left axillary hematoma doing well We reviewed risk reduction strategies including maintaining a BMI <25, limiting red meat intake and alcoholic beverages to 3 per week and exercise (150 min/ week low intensity or 75 min/week high intensity). And maintaining a normal vitamin D level  and stress management strategies.  plan mg/sono 12/2024 SOZO today WNL next in 4-5 months She knows to call or return sooner should any concerns or questions arise.

## 2024-11-12 ENCOUNTER — APPOINTMENT (OUTPATIENT)
Dept: HEMATOLOGY ONCOLOGY | Facility: CLINIC | Age: 54
End: 2024-11-12
Payer: COMMERCIAL

## 2024-11-12 ENCOUNTER — RESULT REVIEW (OUTPATIENT)
Age: 54
End: 2024-11-12

## 2024-11-12 VITALS
TEMPERATURE: 98 F | SYSTOLIC BLOOD PRESSURE: 136 MMHG | OXYGEN SATURATION: 96 % | BODY MASS INDEX: 23.99 KG/M2 | WEIGHT: 130.38 LBS | HEART RATE: 69 BPM | DIASTOLIC BLOOD PRESSURE: 72 MMHG | RESPIRATION RATE: 18 BRPM | HEIGHT: 62 IN

## 2024-11-12 DIAGNOSIS — C50.412 MALIGNANT NEOPLASM OF UPPER-OUTER QUADRANT OF LEFT FEMALE BREAST: ICD-10-CM

## 2024-11-12 DIAGNOSIS — Z17.0 MALIGNANT NEOPLASM OF UPPER-OUTER QUADRANT OF LEFT FEMALE BREAST: ICD-10-CM

## 2024-11-12 PROCEDURE — 99214 OFFICE O/P EST MOD 30 MIN: CPT

## 2024-11-13 LAB — 25(OH)D3 SERPL-MCNC: 30.2 NG/ML

## 2025-02-21 ENCOUNTER — APPOINTMENT (OUTPATIENT)
Dept: HEMATOLOGY ONCOLOGY | Facility: CLINIC | Age: 55
End: 2025-02-21

## 2025-02-25 ENCOUNTER — RESULT REVIEW (OUTPATIENT)
Age: 55
End: 2025-02-25

## 2025-02-25 ENCOUNTER — APPOINTMENT (OUTPATIENT)
Dept: HEMATOLOGY ONCOLOGY | Facility: CLINIC | Age: 55
End: 2025-02-25
Payer: COMMERCIAL

## 2025-02-25 VITALS
RESPIRATION RATE: 17 BRPM | DIASTOLIC BLOOD PRESSURE: 80 MMHG | HEIGHT: 62 IN | TEMPERATURE: 98 F | WEIGHT: 127 LBS | BODY MASS INDEX: 23.37 KG/M2 | SYSTOLIC BLOOD PRESSURE: 131 MMHG | OXYGEN SATURATION: 97 %

## 2025-02-25 DIAGNOSIS — R53.83 OTHER FATIGUE: ICD-10-CM

## 2025-02-25 DIAGNOSIS — G25.81 RESTLESS LEGS SYNDROME: ICD-10-CM

## 2025-02-25 DIAGNOSIS — K63.5 POLYP OF COLON: ICD-10-CM

## 2025-02-25 PROCEDURE — 99214 OFFICE O/P EST MOD 30 MIN: CPT

## 2025-03-04 ENCOUNTER — APPOINTMENT (OUTPATIENT)
Dept: BREAST CENTER | Facility: CLINIC | Age: 55
End: 2025-03-04
Payer: COMMERCIAL

## 2025-03-04 VITALS
HEIGHT: 62 IN | DIASTOLIC BLOOD PRESSURE: 87 MMHG | HEART RATE: 66 BPM | BODY MASS INDEX: 23 KG/M2 | WEIGHT: 125 LBS | SYSTOLIC BLOOD PRESSURE: 132 MMHG

## 2025-03-04 DIAGNOSIS — C50.412 MALIGNANT NEOPLASM OF UPPER-OUTER QUADRANT OF LEFT FEMALE BREAST: ICD-10-CM

## 2025-03-04 DIAGNOSIS — N63.0 UNSPECIFIED LUMP IN UNSPECIFIED BREAST: ICD-10-CM

## 2025-03-04 DIAGNOSIS — Z80.3 FAMILY HISTORY OF MALIGNANT NEOPLASM OF BREAST: ICD-10-CM

## 2025-03-04 DIAGNOSIS — Z17.0 MALIGNANT NEOPLASM OF UPPER-OUTER QUADRANT OF LEFT FEMALE BREAST: ICD-10-CM

## 2025-03-04 DIAGNOSIS — R59.9 ENLARGED LYMPH NODES, UNSPECIFIED: ICD-10-CM

## 2025-03-04 DIAGNOSIS — N64.89 OTHER SPECIFIED DISORDERS OF BREAST: ICD-10-CM

## 2025-03-04 PROCEDURE — 99214 OFFICE O/P EST MOD 30 MIN: CPT | Mod: 25

## 2025-03-04 PROCEDURE — 93702 BIS XTRACELL FLUID ANALYSIS: CPT | Mod: NC

## 2025-06-06 ENCOUNTER — NON-APPOINTMENT (OUTPATIENT)
Age: 55
End: 2025-06-06

## 2025-07-07 ENCOUNTER — NON-APPOINTMENT (OUTPATIENT)
Age: 55
End: 2025-07-07

## 2025-07-14 ENCOUNTER — NON-APPOINTMENT (OUTPATIENT)
Age: 55
End: 2025-07-14

## 2025-07-15 ENCOUNTER — RESULT REVIEW (OUTPATIENT)
Age: 55
End: 2025-07-15

## 2025-07-15 ENCOUNTER — APPOINTMENT (OUTPATIENT)
Dept: HEMATOLOGY ONCOLOGY | Facility: CLINIC | Age: 55
End: 2025-07-15

## 2025-07-15 VITALS
SYSTOLIC BLOOD PRESSURE: 133 MMHG | HEART RATE: 63 BPM | WEIGHT: 130.38 LBS | OXYGEN SATURATION: 96 % | RESPIRATION RATE: 17 BRPM | DIASTOLIC BLOOD PRESSURE: 86 MMHG | HEIGHT: 62 IN | TEMPERATURE: 97.9 F | BODY MASS INDEX: 23.99 KG/M2

## 2025-07-15 PROCEDURE — G2211 COMPLEX E/M VISIT ADD ON: CPT | Mod: NC

## 2025-07-15 PROCEDURE — 99214 OFFICE O/P EST MOD 30 MIN: CPT
